# Patient Record
Sex: MALE | Race: WHITE | NOT HISPANIC OR LATINO | URBAN - METROPOLITAN AREA
[De-identification: names, ages, dates, MRNs, and addresses within clinical notes are randomized per-mention and may not be internally consistent; named-entity substitution may affect disease eponyms.]

---

## 2023-06-12 RX ORDER — APIXABAN 2.5 MG/1
1 TABLET, FILM COATED ORAL
Refills: 0 | DISCHARGE
Start: 2023-06-12

## 2023-06-15 ENCOUNTER — INPATIENT (INPATIENT)
Facility: HOSPITAL | Age: 88
LOS: 0 days | Discharge: HOME | DRG: 641 | End: 2023-06-16
Attending: SPECIALIST | Admitting: SPECIALIST
Payer: COMMERCIAL

## 2023-06-15 VITALS
HEART RATE: 57 BPM | DIASTOLIC BLOOD PRESSURE: 64 MMHG | TEMPERATURE: 98 F | OXYGEN SATURATION: 98 % | HEIGHT: 69 IN | RESPIRATION RATE: 20 BRPM | WEIGHT: 130.07 LBS | SYSTOLIC BLOOD PRESSURE: 141 MMHG

## 2023-06-15 DIAGNOSIS — I10 ESSENTIAL (PRIMARY) HYPERTENSION: ICD-10-CM

## 2023-06-15 DIAGNOSIS — Z29.9 ENCOUNTER FOR PROPHYLACTIC MEASURES, UNSPECIFIED: ICD-10-CM

## 2023-06-15 DIAGNOSIS — E78.5 HYPERLIPIDEMIA, UNSPECIFIED: ICD-10-CM

## 2023-06-15 DIAGNOSIS — E87.1 HYPO-OSMOLALITY AND HYPONATREMIA: ICD-10-CM

## 2023-06-15 DIAGNOSIS — D64.9 ANEMIA, UNSPECIFIED: ICD-10-CM

## 2023-06-15 DIAGNOSIS — R64 CACHEXIA: ICD-10-CM

## 2023-06-15 DIAGNOSIS — G40.909 EPILEPSY, UNSPECIFIED, NOT INTRACTABLE, WITHOUT STATUS EPILEPTICUS: ICD-10-CM

## 2023-06-15 DIAGNOSIS — K59.00 CONSTIPATION, UNSPECIFIED: ICD-10-CM

## 2023-06-15 DIAGNOSIS — I48.91 UNSPECIFIED ATRIAL FIBRILLATION: ICD-10-CM

## 2023-06-15 LAB
ALBUMIN SERPL ELPH-MCNC: 3.4 G/DL — SIGNIFICANT CHANGE UP (ref 3.3–5)
ALP SERPL-CCNC: 86 U/L — SIGNIFICANT CHANGE UP (ref 40–120)
ALT FLD-CCNC: 7 U/L — LOW (ref 10–45)
ANION GAP SERPL CALC-SCNC: 12 MMOL/L — SIGNIFICANT CHANGE UP (ref 5–17)
APTT BLD: 34.2 SEC — SIGNIFICANT CHANGE UP (ref 27.5–35.5)
AST SERPL-CCNC: 21 U/L — SIGNIFICANT CHANGE UP (ref 10–40)
BASOPHILS # BLD AUTO: 0.04 K/UL — SIGNIFICANT CHANGE UP (ref 0–0.2)
BASOPHILS NFR BLD AUTO: 0.5 % — SIGNIFICANT CHANGE UP (ref 0–2)
BILIRUB SERPL-MCNC: 0.4 MG/DL — SIGNIFICANT CHANGE UP (ref 0.2–1.2)
BLD GP AB SCN SERPL QL: NEGATIVE — SIGNIFICANT CHANGE UP
BUN SERPL-MCNC: 11 MG/DL — SIGNIFICANT CHANGE UP (ref 7–23)
CALCIUM SERPL-MCNC: 8.8 MG/DL — SIGNIFICANT CHANGE UP (ref 8.4–10.5)
CHLORIDE SERPL-SCNC: 95 MMOL/L — LOW (ref 96–108)
CO2 SERPL-SCNC: 22 MMOL/L — SIGNIFICANT CHANGE UP (ref 22–31)
CREAT SERPL-MCNC: 0.49 MG/DL — LOW (ref 0.5–1.3)
EGFR: 99 ML/MIN/1.73M2 — SIGNIFICANT CHANGE UP
EOSINOPHIL # BLD AUTO: 0.21 K/UL — SIGNIFICANT CHANGE UP (ref 0–0.5)
EOSINOPHIL NFR BLD AUTO: 2.5 % — SIGNIFICANT CHANGE UP (ref 0–6)
GLUCOSE SERPL-MCNC: 140 MG/DL — HIGH (ref 70–99)
HCT VFR BLD CALC: 34.4 % — LOW (ref 39–50)
HGB BLD-MCNC: 11.4 G/DL — LOW (ref 13–17)
IMM GRANULOCYTES NFR BLD AUTO: 0.4 % — SIGNIFICANT CHANGE UP (ref 0–0.9)
INR BLD: 1.24 — HIGH (ref 0.88–1.16)
LYMPHOCYTES # BLD AUTO: 1.39 K/UL — SIGNIFICANT CHANGE UP (ref 1–3.3)
LYMPHOCYTES # BLD AUTO: 16.3 % — SIGNIFICANT CHANGE UP (ref 13–44)
MCHC RBC-ENTMCNC: 32 PG — SIGNIFICANT CHANGE UP (ref 27–34)
MCHC RBC-ENTMCNC: 33.1 GM/DL — SIGNIFICANT CHANGE UP (ref 32–36)
MCV RBC AUTO: 96.6 FL — SIGNIFICANT CHANGE UP (ref 80–100)
MONOCYTES # BLD AUTO: 0.61 K/UL — SIGNIFICANT CHANGE UP (ref 0–0.9)
MONOCYTES NFR BLD AUTO: 7.2 % — SIGNIFICANT CHANGE UP (ref 2–14)
NEUTROPHILS # BLD AUTO: 6.24 K/UL — SIGNIFICANT CHANGE UP (ref 1.8–7.4)
NEUTROPHILS NFR BLD AUTO: 73.1 % — SIGNIFICANT CHANGE UP (ref 43–77)
NRBC # BLD: 0 /100 WBCS — SIGNIFICANT CHANGE UP (ref 0–0)
PLATELET # BLD AUTO: 306 K/UL — SIGNIFICANT CHANGE UP (ref 150–400)
POTASSIUM SERPL-MCNC: 4.6 MMOL/L — SIGNIFICANT CHANGE UP (ref 3.5–5.3)
POTASSIUM SERPL-SCNC: 4.6 MMOL/L — SIGNIFICANT CHANGE UP (ref 3.5–5.3)
PROT SERPL-MCNC: 6.5 G/DL — SIGNIFICANT CHANGE UP (ref 6–8.3)
PROTHROM AB SERPL-ACNC: 14.8 SEC — HIGH (ref 10.5–13.4)
RBC # BLD: 3.56 M/UL — LOW (ref 4.2–5.8)
RBC # FLD: 14.8 % — HIGH (ref 10.3–14.5)
RH IG SCN BLD-IMP: POSITIVE — SIGNIFICANT CHANGE UP
SODIUM SERPL-SCNC: 129 MMOL/L — LOW (ref 135–145)
WBC # BLD: 8.52 K/UL — SIGNIFICANT CHANGE UP (ref 3.8–10.5)
WBC # FLD AUTO: 8.52 K/UL — SIGNIFICANT CHANGE UP (ref 3.8–10.5)

## 2023-06-15 PROCEDURE — 99285 EMERGENCY DEPT VISIT HI MDM: CPT

## 2023-06-15 PROCEDURE — 71045 X-RAY EXAM CHEST 1 VIEW: CPT | Mod: 26

## 2023-06-15 RX ORDER — POLYETHYLENE GLYCOL 3350 17 G/17G
17 POWDER, FOR SOLUTION ORAL
Refills: 0 | DISCHARGE

## 2023-06-15 RX ORDER — LAMOTRIGINE 25 MG/1
50 TABLET, ORALLY DISINTEGRATING ORAL EVERY 12 HOURS
Refills: 0 | Status: DISCONTINUED | OUTPATIENT
Start: 2023-06-15 | End: 2023-06-16

## 2023-06-15 RX ORDER — POLYETHYLENE GLYCOL 3350 17 G/17G
17 POWDER, FOR SOLUTION ORAL DAILY
Refills: 0 | Status: DISCONTINUED | OUTPATIENT
Start: 2023-06-15 | End: 2023-06-16

## 2023-06-15 RX ORDER — ROSUVASTATIN CALCIUM 5 MG/1
1 TABLET ORAL
Refills: 0 | DISCHARGE

## 2023-06-15 RX ORDER — LOSARTAN POTASSIUM 100 MG/1
50 TABLET, FILM COATED ORAL DAILY
Refills: 0 | Status: DISCONTINUED | OUTPATIENT
Start: 2023-06-16 | End: 2023-06-16

## 2023-06-15 RX ORDER — OLMESARTAN MEDOXOMIL 5 MG/1
1 TABLET, FILM COATED ORAL
Refills: 0 | DISCHARGE

## 2023-06-15 RX ORDER — METOPROLOL TARTRATE 50 MG
6.25 TABLET ORAL
Refills: 0 | DISCHARGE

## 2023-06-15 RX ORDER — LAMOTRIGINE 25 MG/1
1 TABLET, ORALLY DISINTEGRATING ORAL
Refills: 0 | DISCHARGE

## 2023-06-15 NOTE — PATIENT PROFILE ADULT - FALL HARM RISK - UNIVERSAL INTERVENTIONS
Bed in lowest position, wheels locked, appropriate side rails in place/Call bell, personal items and telephone in reach/Instruct patient to call for assistance before getting out of bed or chair/Non-slip footwear when patient is out of bed/[place] to call system/Physically safe environment - no spills, clutter or unnecessary equipment/Purposeful Proactive Rounding/Room/bathroom lighting operational, light cord in reach

## 2023-06-15 NOTE — H&P ADULT - NSHPPHYSICALEXAM_GEN_ALL_CORE
General: cachectic appearing, NAD; turns head to name intermittently   Neurologic: AAOx0; opens eyes, unable to follow commands, non- communicable   Head: NC/AT  Eyes: PERRL, EOMI, no scleral icterus; no nystagmus  ENT: no nasal discharge; uvula midline, no oropharyngeal erythema or exudates; dry MMM *tried biting when tried to look into patients mouth  Neck: supple; no thyromegaly  Respiratory: productive cough; normal effort; symmetric inhalation and exhalation; rhonchi heard in b/l lung fields on inspiration and expiration- cleared with coughing  Cardiac: irregularly irregular; +s1 and s2; no MRG ; non displaced PMI; no JVD  Gastrointestinal: nondistended; nontender; no rebound or guarding; hypoactive bowel sounds [id number]  Extremities:  L arm contracted; no clubbing, cyanosis; 2+ pitting edema L>R of ankles (baseline according to HHA)  Vascular: 2+ radial, femoral, DP/PT pulses B/L  Dermatologic: skin warm, dry and intact; sacral ulcer

## 2023-06-15 NOTE — ED PROVIDER NOTE - CLINICAL SUMMARY MEDICAL DECISION MAKING FREE TEXT BOX
Hx provided by son Keegan at bedside  88M PMH meningioma (large, non-operable), seizure, Afib on Eliquis presents for PEG. Pt lives w/ wife, has 24/7 care. Baseline is bedbound and non-communicative. Has sacral ulcer. Was hospitalized ~6w ago at [facility] for aspiration pna and has hx recurrent aspiration and chronic cough presumed 2/2 aspiration. Has several weeks of decreasing PO intake. Son states they were in touch w/ Dr. Archuleta who reviewed records and referred to ED for admission for feeding tube.  No reported fevers, vomiting, black stool.  Vitals wnl, exam as above.  ddx: Failure to thrive, sent for PEG.  Pre-op labs/CXR/EKG. Attempt to contact Dr. Archuleta.

## 2023-06-15 NOTE — H&P ADULT - NSHPSOCIALHISTORY_GEN_ALL_CORE
In terms of social hx, patient lives with wife and has 24/7 care. Patient is bedbound and non-communicative, reliant on others for all ADL/IADLs

## 2023-06-15 NOTE — ED ADULT NURSE NOTE - NSFALLHARMRISKINTERV_ED_ALL_ED
Assistance OOB with selected safe patient handling equipment if applicable/Communicate risk of Fall with Harm to all staff, patient, and family/Monitor gait and stability/Provide patient with walking aids/Provide visual cue: red socks, yellow wristband, yellow gown, etc/Reinforce activity limits and safety measures with patient and family/Bed in lowest position, wheels locked, appropriate side rails in place/Call bell, personal items and telephone in reach/Instruct patient to call for assistance before getting out of bed/chair/stretcher/Non-slip footwear applied when patient is off stretcher/[employer] to call system/Physically safe environment - no spills, clutter or unnecessary equipment/Purposeful Proactive Rounding/Room/bathroom lighting operational, light cord in reach

## 2023-06-15 NOTE — ED ADULT TRIAGE NOTE - CHIEF COMPLAINT QUOTE
Pt presents to ED ref by MD Archuleta C/O increased weakness, decreased P.O. Hx meningioma, seizure, Afib, Eliquis. Pt dx dementia, wheelchair bound. nonverbal. Son and HHA at bedside.

## 2023-06-15 NOTE — H&P ADULT - PROBLEM SELECTOR PLAN 8
Fluid: none  E: replete to K>4, Mg>2, Phos>2.5  Nutrition/Diet: NPO  GI ppx: none  DVT ppx: lovenox 40 mg subq- to be hold for procedure tomorrow  Lines: peripherals  Code Status: full code  Dispo: F Fluid: none  E: replete to K>4, Mg>2, Phos>2.5  Nutrition/Diet: NPO  GI ppx: none  DVT ppx: SCD- can transition to lovenox post procedure  Lines: peripherals  Code Status: full code  Dispo: RMF

## 2023-06-15 NOTE — H&P ADULT - NSHPLABSRESULTS_GEN_ALL_CORE
.  LABS:                         11.4   8.52  )-----------( 306      ( 15 Ho 2023 17:06 )             34.4     06-15    129<L>  |  95<L>  |  11  ----------------------------<  140<H>  4.6   |  22  |  0.49<L>    Ca    8.8      15 Ho 2023 17:06    TPro  6.5  /  Alb  3.4  /  TBili  0.4  /  DBili  x   /  AST  21  /  ALT  7<L>  /  AlkPhos  86  06-15    PT/INR - ( 15 Ho 2023 17:06 )   PT: 14.8 sec;   INR: 1.24          PTT - ( 15 Ho 2023 17:06 )  PTT:34.2 sec          RADIOLOGY, EKG & ADDITIONAL TESTS: Reviewed.

## 2023-06-15 NOTE — ED PROVIDER NOTE - OBJECTIVE STATEMENT
Hx provided by son Keegan at bedside  88M PMH meningioma (large, non-operable), seizure, Afib on Eliquis presents for PEG. Pt lives w/ wife, has 24/7 care. Baseline is bedbound and non-communicative. Has sacral ulcer. Was hospitalized ~6w ago at [facility] for aspiration pna and has hx recurrent aspiration and chronic cough presumed 2/2 aspiration. Has several weeks of decreasing PO intake. Son states they were in touch w/ Dr. Archuleta who reviewed records and referred to ED for admission for feeding tube.  No reported fevers, vomiting, black stool.

## 2023-06-15 NOTE — H&P ADULT - PROBLEM SELECTOR PLAN 4
Home meds: Lamotrigine 50 mg BID, clonazepam 0.25 mg for active seizure  As per family, last seizure was last week, however before that, was months ago (present as grand mals)    Plan  - c/w home med

## 2023-06-15 NOTE — H&P ADULT - HISTORY OF PRESENT ILLNESS
Patient is an 89yo M PMH meningioma (large, non-operable), seizure, sacral ulcer, recurrent aspirations, Afib on Eliquis who presents to [facility] for PEG tube placement w Dr. Archuleta iso poor PO intake. According to family at bedside, patient was admitted to [facility] approx 6 weeks ago iso aspiration pna and since that admission, has had poor PO intake. Son reached out to Dr. Archuleta, who recommended PEG tube placement.   In terms of social hx, patient lives with wife and has 24/7 care. Patient is bedbound and non-communicative, reliant on others for all ADL/IADLs.     In ED  VS: T: 98.1, HR: 57, BP: 141/64, RR 20, 98% on RA  labs: WBC 8.52, Hgb: 11.4, PLT: 306; PT: 14.8; INR: 1.24; aPTT: 34.2; Na: 129, K: 4.6; BUN: 11; Cr: 0.49  imaging: CXR: no acute pathology         Patient is an 87yo M PMH meningioma (large, non-operable), seizure, sacral ulcer, recurrent aspirations, Afib on Eliquis (last dose 6/12) who presents to [facility] for PEG tube placement w Dr. Archuleta iso poor PO intake. According to family at bedside, patient was admitted to [facility] approx 6 weeks ago iso aspiration pna and since that admission, has had poor PO intake. Son reached out to Dr. Archuleta, who recommended PEG tube placement.   In terms of social hx, patient lives with wife and has 24/7 care. Patient is bedbound and non-communicative, reliant on others for all ADL/IADLs.       In ED  VS: T: 98.1, HR: 57, BP: 141/64, RR 20, 98% on RA  labs: WBC 8.52, Hgb: 11.4, PLT: 306; PT: 14.8; INR: 1.24; aPTT: 34.2; Na: 129, K: 4.6; BUN: 11; Cr: 0.49  imaging: CXR: no acute pathology

## 2023-06-15 NOTE — H&P ADULT - ASSESSMENT
Patient is an 89yo M PMH meningioma (large, non-operable), seizure, sacral ulcer, recurrent aspirations, Afib on Eliquis (last dose 6/12) who presents to [facility] for PEG tube placement w Dr. Geremias olivas poor PO intake.

## 2023-06-15 NOTE — ED ADULT NURSE NOTE - CHIEF COMPLAINT
The patient is a 88y Male complaining of weakness. Skin normal color for race, warm, dry and intact. No evidence of rash.

## 2023-06-15 NOTE — H&P ADULT - PROBLEM SELECTOR PLAN 3
Home meds: Metoprolol 6.25 mg on MWF, Eliquis 2.5mg BID (last dose 6/12)    Plan  - holding Eliquis for procedure  - c/w Metoprolol

## 2023-06-15 NOTE — H&P ADULT - PROBLEM/PLAN-7
DISPLAY PLAN FREE TEXT No. DAVID screening performed.  STOP BANG Legend: 0-2 = LOW Risk; 3-4 = INTERMEDIATE Risk; 5-8 = HIGH Risk

## 2023-06-15 NOTE — ED PROVIDER NOTE - PROGRESS NOTE DETAILS
[place]: Hgb 11.4, INR 1.24, Na 129, Cl 95, other labs grossly wnl. CXR wnl. Pt remains stable in ED. D/w Dr. Archuleta, plan for PEG tomorrow, will admit for further care. Updated son.

## 2023-06-15 NOTE — ED ADULT NURSE NOTE - OBJECTIVE STATEMENT
Pt presents to ED ref by MD Archuleta C/O increased weakness, decreased P.O. intake x1mo. Hx meningioma, seizure, Afib, Eliquis. Pt dx dementia, wheelchair bound. nonverbal. Son and HHA at bedside.

## 2023-06-15 NOTE — H&P ADULT - PROBLEM SELECTOR PLAN 1
Patient with poor PO intake over last 6 mos w severe weight loss. Patient non communicative and family could not quantify how much weight he had lost  Patient admitted under Dr. Archuleta with PEG tube placement tomorrow AM    Plan  - PEG tube placement tomorrow AM  - hold Eliquis (last dose 6/12) Patient with poor PO intake over last 6 mos w severe weight loss. Patient non communicative and family could not quantify how much weight he had lost  Patient admitted under Dr. Archuleta with PEG tube placement tomorrow AM    Plan  - aspiration precautions  - PEG tube placement tomorrow AM  - hold Eliquis (last dose 6/12) Patient with poor PO intake over last 6 wks   Patient admitted under Dr. Archuleta with PEG tube placement tomorrow AM    Plan  - aspiration precautions  - PEG tube placement tomorrow AM  - hold Eliquis (last dose 6/12)

## 2023-06-16 ENCOUNTER — TRANSCRIPTION ENCOUNTER (OUTPATIENT)
Age: 88
End: 2023-06-16

## 2023-06-16 VITALS
RESPIRATION RATE: 17 BRPM | HEART RATE: 56 BPM | SYSTOLIC BLOOD PRESSURE: 162 MMHG | OXYGEN SATURATION: 97 % | TEMPERATURE: 98 F | DIASTOLIC BLOOD PRESSURE: 78 MMHG

## 2023-06-16 LAB
ANION GAP SERPL CALC-SCNC: 10 MMOL/L — SIGNIFICANT CHANGE UP (ref 5–17)
APTT BLD: 33.3 SEC — SIGNIFICANT CHANGE UP (ref 27.5–35.5)
BUN SERPL-MCNC: 9 MG/DL — SIGNIFICANT CHANGE UP (ref 7–23)
CALCIUM SERPL-MCNC: 8.7 MG/DL — SIGNIFICANT CHANGE UP (ref 8.4–10.5)
CHLORIDE SERPL-SCNC: 99 MMOL/L — SIGNIFICANT CHANGE UP (ref 96–108)
CO2 SERPL-SCNC: 23 MMOL/L — SIGNIFICANT CHANGE UP (ref 22–31)
CREAT SERPL-MCNC: 0.5 MG/DL — SIGNIFICANT CHANGE UP (ref 0.5–1.3)
EGFR: 98 ML/MIN/1.73M2 — SIGNIFICANT CHANGE UP
GLUCOSE SERPL-MCNC: 72 MG/DL — SIGNIFICANT CHANGE UP (ref 70–99)
HCT VFR BLD CALC: 35.2 % — LOW (ref 39–50)
HGB BLD-MCNC: 11.3 G/DL — LOW (ref 13–17)
INR BLD: 1.19 — HIGH (ref 0.88–1.16)
MAGNESIUM SERPL-MCNC: 2.1 MG/DL — SIGNIFICANT CHANGE UP (ref 1.6–2.6)
MCHC RBC-ENTMCNC: 32 PG — SIGNIFICANT CHANGE UP (ref 27–34)
MCHC RBC-ENTMCNC: 32.1 GM/DL — SIGNIFICANT CHANGE UP (ref 32–36)
MCV RBC AUTO: 99.7 FL — SIGNIFICANT CHANGE UP (ref 80–100)
NRBC # BLD: 0 /100 WBCS — SIGNIFICANT CHANGE UP (ref 0–0)
PHOSPHATE SERPL-MCNC: 3.6 MG/DL — SIGNIFICANT CHANGE UP (ref 2.5–4.5)
PLATELET # BLD AUTO: 292 K/UL — SIGNIFICANT CHANGE UP (ref 150–400)
POTASSIUM SERPL-MCNC: 4.2 MMOL/L — SIGNIFICANT CHANGE UP (ref 3.5–5.3)
POTASSIUM SERPL-SCNC: 4.2 MMOL/L — SIGNIFICANT CHANGE UP (ref 3.5–5.3)
PROTHROM AB SERPL-ACNC: 14.2 SEC — HIGH (ref 10.5–13.4)
RBC # BLD: 3.53 M/UL — LOW (ref 4.2–5.8)
RBC # FLD: 14.8 % — HIGH (ref 10.3–14.5)
SODIUM SERPL-SCNC: 132 MMOL/L — LOW (ref 135–145)
WBC # BLD: 5.94 K/UL — SIGNIFICANT CHANGE UP (ref 3.8–10.5)
WBC # FLD AUTO: 5.94 K/UL — SIGNIFICANT CHANGE UP (ref 3.8–10.5)

## 2023-06-16 PROCEDURE — 85610 PROTHROMBIN TIME: CPT

## 2023-06-16 PROCEDURE — 43246 EGD PLACE GASTROSTOMY TUBE: CPT

## 2023-06-16 PROCEDURE — 71045 X-RAY EXAM CHEST 1 VIEW: CPT

## 2023-06-16 PROCEDURE — 86901 BLOOD TYPING SEROLOGIC RH(D): CPT

## 2023-06-16 PROCEDURE — 84100 ASSAY OF PHOSPHORUS: CPT

## 2023-06-16 PROCEDURE — 99285 EMERGENCY DEPT VISIT HI MDM: CPT

## 2023-06-16 PROCEDURE — 83735 ASSAY OF MAGNESIUM: CPT

## 2023-06-16 PROCEDURE — 85025 COMPLETE CBC W/AUTO DIFF WBC: CPT

## 2023-06-16 PROCEDURE — 36415 COLL VENOUS BLD VENIPUNCTURE: CPT

## 2023-06-16 PROCEDURE — 86850 RBC ANTIBODY SCREEN: CPT

## 2023-06-16 PROCEDURE — L8699: CPT

## 2023-06-16 PROCEDURE — 80053 COMPREHEN METABOLIC PANEL: CPT

## 2023-06-16 PROCEDURE — 85027 COMPLETE CBC AUTOMATED: CPT

## 2023-06-16 PROCEDURE — 85730 THROMBOPLASTIN TIME PARTIAL: CPT

## 2023-06-16 PROCEDURE — 80048 BASIC METABOLIC PNL TOTAL CA: CPT

## 2023-06-16 PROCEDURE — 86900 BLOOD TYPING SEROLOGIC ABO: CPT

## 2023-06-16 DEVICE — PEG KT SAFETY 20FR: Type: IMPLANTABLE DEVICE | Status: FUNCTIONAL

## 2023-06-16 RX ORDER — LOSARTAN POTASSIUM 100 MG/1
50 TABLET, FILM COATED ORAL DAILY
Refills: 0 | Status: DISCONTINUED | OUTPATIENT
Start: 2023-06-16 | End: 2023-06-16

## 2023-06-16 RX ADMIN — Medication 10 MILLIGRAM(S): at 15:23

## 2023-06-16 RX ADMIN — POLYETHYLENE GLYCOL 3350 17 GRAM(S): 17 POWDER, FOR SOLUTION ORAL at 15:23

## 2023-06-16 RX ADMIN — LAMOTRIGINE 50 MILLIGRAM(S): 25 TABLET, ORALLY DISINTEGRATING ORAL at 06:24

## 2023-06-16 RX ADMIN — LOSARTAN POTASSIUM 50 MILLIGRAM(S): 100 TABLET, FILM COATED ORAL at 06:24

## 2023-06-16 NOTE — DISCHARGE NOTE NURSING/CASE MANAGEMENT/SOCIAL WORK - PATIENT PORTAL LINK FT
You can access the FollowMyHealth Patient Portal offered by [facility] by registering at the following website: [email or web address]. By joining [facility]’s FollowMyHealth portal, you will also be able to view your health information using other applications (apps) compatible with our system.

## 2023-06-16 NOTE — PRE-ANESTHESIA EVALUATION ADULT - NSANTHPEFT_GEN_ALL_CORE
Physical Exam: General: cachectic appearing, NAD; turns head to name intermittently   Neurologic: AAOx0; opens eyes, unable to follow commands, non- communicable   Head: NC/AT  Eyes: PERRL, EOMI, no scleral icterus; no nystagmus  ENT: no nasal discharge; uvula midline, no oropharyngeal erythema or exudates; dry MMM *tried biting when tried to look into patients mouth  Neck: supple; no thyromegaly  Respiratory: productive cough; normal effort; symmetric inhalation and exhalation; rhonchi heard in b/l lung fields on inspiration and expiration- cleared with coughing  Cardiac: irregularly irregular; +s1 and s2; no MRG ; non displaced PMI; no JVD  Gastrointestinal: nondistended; nontender; no rebound or guarding; hypoactive bowel sounds [id number]  Extremities:  L arm contracted; no clubbing, cyanosis; 2+ pitting edema L>R of ankles (baseline according to HHA)  Vascular: 2+ radial, femoral, DP/PT pulses B/L  Dermatologic: skin warm, dry and intact; sacral ulcer

## 2023-06-16 NOTE — DISCHARGE NOTE PROVIDER - NSDCMRMEDTOKEN_GEN_ALL_CORE_FT
No interventions ordered
Crestor 20 mg oral tablet: 1 orally 3 times a week MWF  Dulcolax Laxative 10 mg rectal suppository: 1 rectally once a day  Eliquis 2.5 mg oral tablet: 1 orally 2 times a day  lamoTRIgine 50 mg oral tablet, extended release: 1 orally 2 times a day  metoprolol succinate 25 mg oral tablet, extended release: 6.25 milligram(s) orally 3 times a week 6.25 mg WMF  MiraLax oral powder for reconstitution: 17 orally once a day  olmesartan 20 mg oral tablet: 1 orally once a day

## 2023-06-16 NOTE — PROGRESS NOTE ADULT - SUBJECTIVE AND OBJECTIVE BOX
----- INTERVAL HPI/OVERNIGHT EVENTS -----     Patient was seen and examined at bedside. As per nurse and patient, no o/n events, patient resting comfortably. No complaints at this time. Patient denies: fever, chills, dizziness, weakness, HA, Changes in vision, CP, palpitations, SOB, cough, N/V/D/C, dysuria, changes in bowel movements, LE edema. ROS otherwise negative.      Subjective: Patient is a 88y old  Male who presents with a chief complaint of PEG tube placement iso poor PO intake (15 Ho 2023 17:49)      ----- VITAL SIGNS -----  T(F): 97.9 (06-16-23 @ 06:28)  HR: 52 (06-16-23 @ 06:28)  BP: 156/71 (06-16-23 @ 06:28)  RR: 18 (06-16-23 @ 06:28)  SpO2: 98% (06-16-23 @ 06:28)  Wt(kg): --        ----- Physical Exam -----     Constitutional: resting comfortably in bed; NAD  HEENT: NC/AT, PERRL, EOMI, anicteric sclera, no nasal discharge; uvula midline, no oropharyngeal erythema or exudates, MMM; no thyromegaly or anterior/posterior or submental CAROLINE; neck supple  Respiratory: CTA B/L; no W/R/R, no retractions  Cardiac: +S1/S2; RRR; no M/R/G appreciated  Gastrointestinal: abdomen soft, NT/ND, +BS, no rebound tenderness or guarding  Back: no CVAT B/L  Extremities: legs WWP, no clubbing or cyanosis; no peripheral edema  Vascular: 2+ distal pedal pulses B/L  Dermatologic: skin warm, dry and intact; no rashes, wounds, or scars  Neurologic: AAOx3; CNII-XII grossly intact; strength 5/5 and sensation intact in all 4 extremities; no focal deficits  Psychiatric: affect and characteristics of appearance, verbalizations, behaviors are appropriate    MEDICATIONS  (STANDING):  bisacodyl Suppository 10 milliGRAM(s) Rectal daily  lamoTRIgine 50 milliGRAM(s) Oral every 12 hours  losartan 50 milliGRAM(s) Oral daily  polyethylene glycol 3350 17 Gram(s) Oral daily    MEDICATIONS  (PRN):      Allergies    No Known Allergies    Intolerances        ----- LABS -----                         11.3   5.94  )-----------( 292      ( 16 Jun 2023 05:30 )             35.2     06-16    132<L>  |  99  |  9   ----------------------------<  72  4.2   |  23  |  0.50    Ca    8.7      16 Jun 2023 05:30  Phos  3.6     06-16  Mg     2.1     06-16    TPro  6.5  /  Alb  3.4  /  TBili  0.4  /  DBili  x   /  AST  21  /  ALT  7<L>  /  AlkPhos  86  06-15    PT/INR - ( 16 Jun 2023 05:30 )   PT: 14.2 sec;   INR: 1.19          PTT - ( 16 Jun 2023 05:30 )  PTT:33.3 sec        ----- RADIOLOGY & ADDITIONAL TESTS -----     Reviewed

## 2023-06-16 NOTE — PRE-ANESTHESIA EVALUATION ADULT - NSANTHPMHFT_GEN_ALL_CORE
87yo M PMH meningioma (large, non-operable), seizure, sacral ulcer, recurrent aspirations, Afib on Eliquis (last dose 6/12) who presents to [facility] for PEG tube placement w Dr. Archuleta

## 2023-06-16 NOTE — DISCHARGE NOTE PROVIDER - HOSPITAL COURSE
Patient is an 89yo M [facility] meningioma (large, non-operable), seizure, sacral ulcer, recurrent aspirations, Afib on Eliquis (last dose 6/12) who presents to [facility] for PEG tube placement w Dr. Archuleta iso poor PO intake. According to family at bedside, patient was admitted to [facility] approx 6 weeks ago iso aspiration pna and since that admission, has had poor PO intake. Son reached out to Dr. Archuleta, who recommended PEG tube placement.   In terms of social hx, patient lives with wife and has 24/7 care. Patient is bedbound and non-communicative, reliant on others for all ADL/IADLs.       #Cachexia.    Patient with poor PO intake over last 6 wks s/p PEG in 6/16/2023 by Dr. Archuleta.       Follow up Dr. Archuleta office next week after dc

## 2023-06-16 NOTE — PRE-ANESTHESIA EVALUATION ADULT - NSRADCARDRESULTSFT_GEN_ALL_CORE
ACC: [id number] EXAM:  XR CHEST PORTABLE ROUTINE 1V   ORDERED BY: TATE NARANJO     PROCEDURE DATE:  06/15/2023          INTERPRETATION:  History: Evaluate aspiration .    Technique: Single view of the chest was performed.    Comparisons: None.    Findings:    The cardiac silhouette is not enlarged.    The mediastinum does not appear widened.    No pneumothorax is visualized.    No pneumomediastinum is visualized.    No pleural effusions visualized.    No evidence of pneumonia visualized.    No acute fracture is visualized.    Impression: No acute abnormality visualized.    --- End of Report ---

## 2023-06-16 NOTE — DISCHARGE NOTE PROVIDER - CARE PROVIDER_API CALL
Elroy Archuleta  Gastroenterology  [address], [address]  [place] [address]  Phone: [phone]  Fax: [phone]  Follow Up Time: 1 week

## 2023-06-16 NOTE — DISCHARGE NOTE NURSING/CASE MANAGEMENT/SOCIAL WORK - NSDCPEFALRISK_GEN_ALL_CORE
For information on Fall & Injury Prevention, visit: [email or web address] OR  [email or web address] OR  https://www.cdc.gov/steadi/patient.html

## 2023-06-16 NOTE — DISCHARGE NOTE PROVIDER - NSDCCPCAREPLAN_GEN_ALL_CORE_FT
PRINCIPAL DISCHARGE DIAGNOSIS  Diagnosis: Adult failure to thrive  Assessment and Plan of Treatment: PEG TUBE PLACED for poor po intake. Can resume feeds and tube feed will be deliver at home today managed by Dr. Archuleta office.

## 2023-06-16 NOTE — PRE-ANESTHESIA EVALUATION ADULT - NSANTHADDINFOFT_GEN_ALL_CORE
Risks, benefits and alternatives discussed; including but not limited to headache, nausea, bleeding, infection and local trauma/positioning related injury. All questions answered. The patient agrees to proceed. Risks, benefits and alternatives discussed; including but not limited to headache, nausea, bleeding, infection and local trauma/positioning related injury. All questions answered. The patient's family agrees to proceed.

## 2023-06-25 DIAGNOSIS — Z74.01 BED CONFINEMENT STATUS: ICD-10-CM

## 2023-06-25 DIAGNOSIS — G40.909 EPILEPSY, UNSPECIFIED, NOT INTRACTABLE, WITHOUT STATUS EPILEPTICUS: ICD-10-CM

## 2023-06-25 DIAGNOSIS — D32.9 BENIGN NEOPLASM OF MENINGES, UNSPECIFIED: ICD-10-CM

## 2023-06-25 DIAGNOSIS — R64 CACHEXIA: ICD-10-CM

## 2023-06-25 DIAGNOSIS — E78.5 HYPERLIPIDEMIA, UNSPECIFIED: ICD-10-CM

## 2023-06-25 DIAGNOSIS — I48.91 UNSPECIFIED ATRIAL FIBRILLATION: ICD-10-CM

## 2023-06-25 DIAGNOSIS — K29.00 ACUTE GASTRITIS WITHOUT BLEEDING: ICD-10-CM

## 2023-06-25 DIAGNOSIS — Z79.01 LONG TERM (CURRENT) USE OF ANTICOAGULANTS: ICD-10-CM

## 2023-06-25 DIAGNOSIS — R62.7 ADULT FAILURE TO THRIVE: ICD-10-CM

## 2023-06-25 DIAGNOSIS — K59.00 CONSTIPATION, UNSPECIFIED: ICD-10-CM

## 2023-06-25 DIAGNOSIS — E87.1 HYPO-OSMOLALITY AND HYPONATREMIA: ICD-10-CM

## 2023-06-25 DIAGNOSIS — I10 ESSENTIAL (PRIMARY) HYPERTENSION: ICD-10-CM

## 2023-06-25 DIAGNOSIS — L89.152 PRESSURE ULCER OF SACRAL REGION, STAGE 2: ICD-10-CM
